# Patient Record
Sex: FEMALE | Race: WHITE
[De-identification: names, ages, dates, MRNs, and addresses within clinical notes are randomized per-mention and may not be internally consistent; named-entity substitution may affect disease eponyms.]

---

## 2021-07-04 ENCOUNTER — HOSPITAL ENCOUNTER (EMERGENCY)
Dept: HOSPITAL 11 - JP.ED | Age: 51
Discharge: HOME | End: 2021-07-04
Payer: COMMERCIAL

## 2021-07-04 DIAGNOSIS — H69.83: Primary | ICD-10-CM

## 2021-07-04 DIAGNOSIS — F17.210: ICD-10-CM

## 2021-07-04 DIAGNOSIS — R42: ICD-10-CM

## 2021-07-04 NOTE — EDM.PDOC
ED HPI GENERAL MEDICAL PROBLEM





- General


Chief Complaint: ENT Problem


Stated Complaint: PROBLEMS WITH SINUS AND EARS


Time Seen by Provider: 07/04/21 10:50


Source of Information: Reports: Patient


History Limitations: Reports: No Limitations





- History of Present Illness


INITIAL COMMENTS - FREE TEXT/NARRATIVE: 





49 yo female presenting to ER with face pressure, full ears, vertigo and 

headache.  She was treated for sinus infection with a 5 day course of 

antibiotics that brought some relief.  She is visiting the area from [facility].  She has been taking sinus cold ever 6 hours with little relief.  sinus 

pressure and headache.  post nasal drip.  Last evening she had mild nausea 

without emesis.  denies cough, SOB, sore throat or fever.  present today with 

her daughter


  ** Face/Facial


Pain Score (Numeric/FACES): 5





- Related Data


                                    Allergies











Allergy/AdvReac Type Severity Reaction Status Date / Time


 


No Known Allergies Allergy   Verified 07/04/21 10:26











Home Meds: 


                                    Home Meds





NK [No Known Home Meds]  07/04/21 [History]











Past Medical History


OB/GYN History: Reports: Pregnancy





Social & Family History





- Tobacco Use


Tobacco Use Status *Q: Current Every Day Tobacco User


Years of Tobacco use: 45


Packs/Tins Daily: 0.5





- Caffeine Use


Caffeine Use: Reports: Coffee





- Alcohol Use


Days Per Week of Alcohol Use: 7


Number of Drinks Per Day: 2


Total Drinks Per Week: 14





- Recreational Drug Use


Recreational Drug Use: No





ED ROS ENT





- Review of Systems


Review Of Systems: See Below


Constitutional: Denies: Fever, Chills, Fatigue


HEENT: Reports: Rhinitis, Sinus Problem.  Denies: Throat Pain


Respiratory: Denies: Shortness of Breath, Wheezing


Cardiovascular: Denies: Chest Pain


Endocrine: Denies: Fatigue


GI/Abdominal: Reports: Nausea





ED EXAM, ENT





- Physical Exam


Exam: See Below


Exam Limited By: No Limitations


General Appearance: Alert, WD/WN, No Apparent Distress


Eye Exam: Bilateral Eye: EOMI, PERRL


Ears: Normal External Exam, Normal Canal, Hearing Grossly Normal, Normal TMs, TM

 Dullness, TM Fluid


Nose: Other (moderate reythema with moderate boggy edema)


Mouth/Throat: Normal Inspection, Normal Gums, Normal Lips, Normal Oropharynx, 

Normal Teeth


Head: Atraumatic, Normocephalic


Neck: Supple, Non-Tender, Full Range of Motion, Lymphadenopathy (R), 

Lymphadenopathy (L)


Respiratory/Chest: No Respiratory Distress, Lungs Clear, Normal Breath Sounds, 

No Accessory Muscle Use, Chest Non-Tender


Cardiovascular: Normal Peripheral Pulses, Regular Rate, Rhythm, No Edema


GI/Abdominal: Soft, Non-Tender





Course





- Vital Signs


Last Recorded V/S: 


                                Last Vital Signs











Temp  36.6 C   07/04/21 10:31


 


Pulse  81   07/04/21 10:31


 


Resp  18   07/04/21 10:31


 


BP  116/85   07/04/21 10:31


 


Pulse Ox  95   07/04/21 10:31














Departure





- Departure


Time of Disposition: 11:06


Disposition: Home, Self-Care 01


Condition: Good


Clinical Impression: 


 Vertigo





Eustachian tube dysfunction


Qualifiers:


 Laterality: bilateral Qualified Code(s): H69.83 - Other specified disorders of 

Eustachian tube, bilateral








- Discharge Information


*PRESCRIPTION DRUG MONITORING PROGRAM REVIEWED*: Not Applicable


*COPY OF PRESCRIPTION DRUG MONITORING REPORT IN PATIENT [facility]: Not Applicable


Instructions:  Vertigo, Easy-to-Read


Referrals: 


PCP,None [Primary Care Provider] - 


Forms:  ED Department Discharge


Additional Instructions: 


prednisone 100 mg tablets tapering dose for 2 weeks


Take 4 tab PO for 3 days


3 tab for 3days 


2 tab for 3 days


1 tab for 3 days





Over the counter sinus medication





fluid intake with goal of 1.5 liters per day





Sepsis Event Note (ED)





- Evaluation


Sepsis Screening Result: No Definite Risk





- Focused Exam


Vital Signs: 


                                   Vital Signs











  Temp Pulse Resp BP Pulse Ox


 


 07/04/21 10:31  36.6 C  81  18  116/85  95


 


 07/04/21 10:23  36.6 C  81  18  116/85  95